# Patient Record
Sex: MALE | Race: AMERICAN INDIAN OR ALASKA NATIVE | Employment: OTHER | URBAN - METROPOLITAN AREA
[De-identification: names, ages, dates, MRNs, and addresses within clinical notes are randomized per-mention and may not be internally consistent; named-entity substitution may affect disease eponyms.]

---

## 2020-12-09 ENCOUNTER — TELEPHONE (OUTPATIENT)
Dept: ORTHOPEDICS | Facility: CLINIC | Age: 70
End: 2020-12-09

## 2020-12-09 DIAGNOSIS — M25.562 PAIN IN BOTH KNEES, UNSPECIFIED CHRONICITY: Primary | ICD-10-CM

## 2020-12-09 DIAGNOSIS — M25.561 PAIN IN BOTH KNEES, UNSPECIFIED CHRONICITY: Primary | ICD-10-CM

## 2020-12-09 NOTE — TELEPHONE ENCOUNTER
Financial Counselor Review for Hylan (gel) injection:    Procedure to be performed (include CPT code):Yes DRAIN/INJECT LARGE JOINT/BURSA - CPT: 24577    Diagnosis code (include ICD-10 code): 715.96    Have they tried and failed any gel injections already? Ibuprofen, Tylenol.    Medication order (include J code):Yes     Synvisc One (Hylan G-F 20) -   Euflexxa -   Supartz -   Gel One -   Monovisc -  ([employer] 2019 - no PA needed)  Orthovisc -  ([employer] 2019 - no PA needed)    Coverage and patient financial responsibility information:YES    Does patient need to be contacted by Financial Counselor:YES, only if Pt is required to pay anything    Note: Do not use abbreviations and route encounter to [facility] [[id number]]    --------------    FYI: If Patient has [employer] insurance, for 2019 year patient must try and fail Monovisc  or Orthovisc  before a PA can be done on Synvisc One, Euflexxa, Supartz, Gel One injections. No auth or pre-d is needed on Mono or Ortho injections and are a covered benefit under this patients plan.

## 2020-12-09 NOTE — TELEPHONE ENCOUNTER
PB DOS: TBD  Type of Procedure: Gel injections  CPT Codes: 62756  Synvisc One (Hylan G-F 20) -   Euflexxa -   Supartz -   Gel One -   Monovisc -   Orthovisc -    ICD10 Codes: 715.96  Surgeon/Ordering provider: Dr. Zamora  Pre-cert/Authorization completed:  Yes, No PA Required, Dx code 715.96 does not meet medical necessity.- See Medicare coverage policy below  Payer: Medicare and [facility] [place]  Date Checked: 12/9/2020  Spoke to Online Medicare CMS and [employer] portal  Ref. # NA/ Auth # NA  Valid Dates: NA    The following Dx codes meet medical necessity per Medicare portal:    M17.0 Bilateral primary osteoarthritis of knee   M17.11 Unilateral primary osteoarthritis, right knee   M17.12 Unilateral primary osteoarthritis, left knee   M17.2 Bilateral post-traumatic osteoarthritis of knee   M17.31 Unilateral post-traumatic osteoarthritis, right knee   M17.32 Unilateral post-traumatic osteoarthritis, left knee   M17.4 Other bilateral secondary osteoarthritis of knee   M17.5 Other unilateral secondary osteoarthritis of knee   M17.9 Osteoarthritis of knee, unspecified   M19.011 Primary osteoarthritis, right shoulder   M19.012 Primary osteoarthritis, left shoulder   M19.111 Post-traumatic osteoarthritis, right shoulder   M19.112 Post-traumatic osteoarthritis, left shoulder   M19.211 Secondary osteoarthritis, right shoulder   M19.212 Secondary osteoarthritis, left shoulder       Purified natural hyaluronans have been approved by the FDA for the treatment of pain associated with osteoarthritis of the knee in patients who have failed to respond adequately to conservative nonpharmacologic therapy and simple analgesics. The synovial fluid's capacity to lubricate and absorb shock is typically reduced in joints affected by osteoarthritis. These changes are partly due to a reduction in the concentration and size of hyaluronic acid molecules that are naturally present in synovial fluid. In addition to the  FDA approved use, hyalurons have been recognized as a therapeutic option in osteoarthritis of the shoulder. This article defines coverage criteria for the injection of the knee or shoulder with either sodium hyaluronate (Hyalgan , Supartz  or Visco-3 , Euflexxa , Monovisc , GelSyn-3 , GenVisc  850, Durolane , TriVisc , Synojoynt , Triluron ), hylan G-F 20 (Synvisc , Synvisc-One  ), hyaluronic acid (Gel-One ), high molecular weight hyaluronan (Orthovisc ) or high molecular weight viscoelastic hyaluronan (Hymovis ).      The patient's medical record should contain documentation that fully supports the medical necessity for intra-articular injections of sodium hyaluronate (Hyalgan , Supartz  or Visco-3 , Euflexxa , Monovisc , GelSyn-3 , GenVisc  850, Durolane , TriVisc ), hylan G-F 20 (Synvisc , Synvisc-One  ), hyaluronic acid (Gel-One ), high molecular weight hyaluronan (Orthovisc ) and high molecular weight viscoelastic hyaluronan (Hymovis ). This documentation includes, but is not limited to, relevant medical history, physical examination and results of pertinent diagnostic tests or procedures, and history of pharmacologic therapy. Documentation of subsequent courses of treatment must clearly establish reduction of patient symptomatology and medication usage. This documentation must be submitted upon request. Claims submitted without requested supporting evidence in the medical record will be denied as being not medically necessary.       The dose and frequency of administration should be consistent with the FDA approved labeling.      It is expected that an injection (Synvisc-One  , Gel-One , Durolane ) or course of the injections (Synvisc , Hyalgan , Supartz  or Visco-3 , Euflexxa , Orthovisc , GelSyn-3 , GenVisc  850, Hymovis , TriVisc ) will not be repeated within six months time.    A repeat series of injections may be allowed when:  1.The indications continue to be met; and  2.Significant improvement in pain  and functional capacity from the prior series of injections is documented in the medical record; and  3.The last injection (in a prior course) was given at least six (6) months ago.

## 2020-12-17 ENCOUNTER — ANCILLARY PROCEDURE (OUTPATIENT)
Dept: GENERAL RADIOLOGY | Facility: CLINIC | Age: 70
End: 2020-12-17
Attending: ORTHOPAEDIC SURGERY
Payer: MEDICARE

## 2020-12-17 ENCOUNTER — OFFICE VISIT (OUTPATIENT)
Dept: ORTHOPEDICS | Facility: CLINIC | Age: 70
End: 2020-12-17
Payer: MEDICARE

## 2020-12-17 DIAGNOSIS — M17.0 PRIMARY OSTEOARTHRITIS OF BOTH KNEES: Primary | ICD-10-CM

## 2020-12-17 DIAGNOSIS — M25.562 PAIN IN BOTH KNEES, UNSPECIFIED CHRONICITY: ICD-10-CM

## 2020-12-17 DIAGNOSIS — M25.561 PAIN IN BOTH KNEES, UNSPECIFIED CHRONICITY: ICD-10-CM

## 2020-12-17 PROCEDURE — 73562 X-RAY EXAM OF KNEE 3: CPT | Mod: GC | Performed by: RADIOLOGY

## 2020-12-17 PROCEDURE — 99207 PR DROP WITH A PROCEDURE: CPT | Performed by: ORTHOPAEDIC SURGERY

## 2020-12-17 PROCEDURE — 20610 DRAIN/INJ JOINT/BURSA W/O US: CPT | Mod: 50 | Performed by: ORTHOPAEDIC SURGERY

## 2020-12-17 NOTE — LETTER
12/17/2020         RE: Albert Napoles Sr.  [address] [place] [address]        Dear Colleague,    Thank you for referring your patient, Albert Napoles Sr., to the [facility]. Please see a copy of my visit note below.    Mr. Napoles returns to my clinic today for interval follow-up he is a very pleasant 70-year-old male.  He is well-known to me.  I previously performed viscosupplementation injections on him with good relief.  He comes in today interested in repeat injection.    Exam today shows bilateral medial joint line tenderness.  Less so laterally.  Some patellofemoral and medial compartment crepitation.  Ligaments are stable.  Neurovascularly intact.    New radiographs today show end-stage arthritis of the medial compartment of both knees.  It is profound.    Clinical assessment osteoarthritis bilateral knees    Plan: I offered him repeat viscosupplementation injection.  I also showed him his x-rays and offered referral to joint replacement this time would like to see of these injections ago.    After informed consent was obtained under sterile technique 1 ampule of Synvisc 1 was inject without complication of the right knee.  Then under sterile technique 1 ampule of Synvisc 1 was injected into the left knee the patient tolerated these injections well.  Sterile dressings were applied.  He will follow-up with me on an as-needed basis going forward.  He has a standing offer to discuss his case with one of our joint replacement surgeons if necessary.    Large Joint Injection/Arthocentesis: bilateral knee    Date/Time: 12/17/2020 12:00 PM  Performed by: Juancho Zamora MD  Authorized by: Juancho Zamora MD     Indications:  Pain  Needle Size:  22 G  Guidance: landmark guided    Location:  Knee  Laterality:  Bilateral      Medications (Right):  48 mg hylan 48 MG/6ML  Medications (Left):  48 mg hylan 48 MG/6ML  Outcome:  Tolerated well, no immediate  complications  Procedure discussed: discussed risks, benefits, and alternatives    Consent Given by:  Patient  Timeout: timeout called immediately prior to procedure    Prep: patient was prepped and draped in usual sterile fashion              Again, thank you for allowing me to participate in the care of your patient.        Sincerely,        Juancho Zamora MD

## 2020-12-17 NOTE — NURSING NOTE
Albert Napoles Sr.'s chief complaint for this visit includes:  Chief Complaint   Patient presents with     Left Knee - Pain     Right Knee - Pain     PCP: Guilherme Shahid    Referring Provider:  Guilherme Shahid MD  [address] [place] [address]    There were no vitals taken for this visit.  Data Unavailable     Do you need any medication refills at today's visit? No.

## 2020-12-17 NOTE — PROGRESS NOTES
Mr. Napoles returns to my clinic today for interval follow-up he is a very pleasant 70-year-old male.  He is well-known to me.  I previously performed viscosupplementation injections on him with good relief.  He comes in today interested in repeat injection.    Exam today shows bilateral medial joint line tenderness.  Less so laterally.  Some patellofemoral and medial compartment crepitation.  Ligaments are stable.  Neurovascularly intact.    New radiographs today show end-stage arthritis of the medial compartment of both knees.  It is profound.    Clinical assessment osteoarthritis bilateral knees    Plan: I offered him repeat viscosupplementation injection.  I also showed him his x-rays and offered referral to joint replacement this time would like to see of these injections ago.    After informed consent was obtained under sterile technique 1 ampule of Synvisc 1 was inject without complication of the right knee.  Then under sterile technique 1 ampule of Synvisc 1 was injected into the left knee the patient tolerated these injections well.  Sterile dressings were applied.  He will follow-up with me on an as-needed basis going forward.  He has a standing offer to discuss his case with one of our joint replacement surgeons if necessary.    Large Joint Injection/Arthocentesis: bilateral knee    Date/Time: 12/17/2020 12:00 PM  Performed by: Juancho Zamora MD  Authorized by: Juancho Zamora MD     Indications:  Pain  Needle Size:  22 G  Guidance: landmark guided    Location:  Knee  Laterality:  Bilateral      Medications (Right):  48 mg hylan 48 MG/6ML  Medications (Left):  48 mg hylan 48 MG/6ML  Outcome:  Tolerated well, no immediate complications  Procedure discussed: discussed risks, benefits, and alternatives    Consent Given by:  Patient  Timeout: timeout called immediately prior to procedure    Prep: patient was prepped and draped in usual sterile fashion